# Patient Record
Sex: FEMALE | Race: WHITE | NOT HISPANIC OR LATINO | Employment: OTHER | URBAN - METROPOLITAN AREA
[De-identification: names, ages, dates, MRNs, and addresses within clinical notes are randomized per-mention and may not be internally consistent; named-entity substitution may affect disease eponyms.]

---

## 2017-02-05 ENCOUNTER — PATIENT MESSAGE (OUTPATIENT)
Dept: UROLOGY | Facility: CLINIC | Age: 47
End: 2017-02-05

## 2017-02-06 ENCOUNTER — PATIENT MESSAGE (OUTPATIENT)
Dept: UROLOGY | Facility: CLINIC | Age: 47
End: 2017-02-06

## 2017-02-09 RX ORDER — HYOSCYAMINE SULFATE 0.125 MG
125 TABLET ORAL EVERY 4 HOURS PRN
Qty: 120 TABLET | Refills: 0 | Status: ON HOLD | OUTPATIENT
Start: 2017-02-09 | End: 2017-08-16 | Stop reason: HOSPADM

## 2017-03-16 ENCOUNTER — OFFICE VISIT (OUTPATIENT)
Dept: UROLOGY | Facility: CLINIC | Age: 47
End: 2017-03-16
Payer: COMMERCIAL

## 2017-03-16 VITALS
HEART RATE: 69 BPM | SYSTOLIC BLOOD PRESSURE: 114 MMHG | WEIGHT: 211 LBS | BODY MASS INDEX: 33.12 KG/M2 | DIASTOLIC BLOOD PRESSURE: 74 MMHG | HEIGHT: 67 IN

## 2017-03-16 DIAGNOSIS — R33.9 INCOMPLETE BLADDER EMPTYING: Primary | ICD-10-CM

## 2017-03-16 DIAGNOSIS — N32.81 DETRUSOR INSTABILITY OF BLADDER: ICD-10-CM

## 2017-03-16 PROCEDURE — 99213 OFFICE O/P EST LOW 20 MIN: CPT | Mod: S$GLB,,, | Performed by: UROLOGY

## 2017-03-16 PROCEDURE — 3074F SYST BP LT 130 MM HG: CPT | Mod: S$GLB,,, | Performed by: UROLOGY

## 2017-03-16 PROCEDURE — 95972 ALYS CPLX SP/PN NPGT W/PRGRM: CPT | Mod: S$GLB,,, | Performed by: UROLOGY

## 2017-03-16 PROCEDURE — 3078F DIAST BP <80 MM HG: CPT | Mod: S$GLB,,, | Performed by: UROLOGY

## 2017-03-16 PROCEDURE — 99999 PR PBB SHADOW E&M-EST. PATIENT-LVL IV: CPT | Mod: PBBFAC,,, | Performed by: UROLOGY

## 2017-03-16 PROCEDURE — 1160F RVW MEDS BY RX/DR IN RCRD: CPT | Mod: S$GLB,,, | Performed by: UROLOGY

## 2017-03-16 RX ORDER — IBUPROFEN 200 MG
1 TABLET ORAL
COMMUNITY
Start: 2017-03-01 | End: 2017-08-10

## 2017-03-16 RX ORDER — MIRTAZAPINE 15 MG/1
15 TABLET, FILM COATED ORAL NIGHTLY
Status: ON HOLD | COMMUNITY
End: 2021-05-27 | Stop reason: HOSPADM

## 2017-03-16 RX ORDER — MELOXICAM 7.5 MG/1
15 TABLET ORAL
COMMUNITY
Start: 2017-03-14 | End: 2017-04-19

## 2017-03-16 RX ORDER — DM/P-EPHED/ACETAMINOPH/DOXYLAM 30-7.5/3
LIQUID (ML) ORAL
Refills: 0 | COMMUNITY
Start: 2017-03-01

## 2017-03-16 RX ORDER — ISOTRETINOIN 40 MG/1
CAPSULE ORAL
COMMUNITY
Start: 2017-02-21 | End: 2017-08-10

## 2017-03-16 NOTE — PROGRESS NOTES
CHIEF COMPLAINT:    Mrs. Garcia is a 46 y.o. female presenting for follow up for frequency, urgency, incomplete bladder emptying, s/p InterStim placement 12/15/15.    PRESENTING ILLNESS:    Asad Garcia is a 46 y.o. female who underwent InterStim placement on 12/15/15. She states she has done well with the device up until 2 weeks ago when there was a malfunction with her Icon. At baseline her frequency, urgency has greatly improved. She is only experiencing nocturia x 1. She feels as though she is emptying well. For the past 2 weeks since the device has not been working her symptoms are back to how they were prior to InterStim placement with frequncy, urgency, urge incontinence, and nocturia x 3. She denies dysuria or hematuria.     She was recently diagnosed with degenerative disc disease.     REVIEW OF SYSTEMS:    She denies nausea, vomiting, shortness or breath, chest pain, fever, chills.     PATIENT HISTORY:    Past Medical History:   Diagnosis Date    Asthma     Bipolar 2 disorder     Hx of bronchitis     Hx of bronchitis     Hypertension     Urinary tract infection     Vaginal infection        Past Surgical History:   Procedure Laterality Date    ANKLE SURGERY Right     TENDON REPAIR    bladder interstem stimulator      CYST REMOVAL UNDER ARM Right     HYSTERECTOMY      INGUINAL HIDRADENITIS EXCISION  01/09/2017    interstim      SPINAL CORD STIMULATOR IMPLANT         Family History   Problem Relation Age of Onset    Heart attack Father     Multiple sclerosis Mother     Hypertension Mother     Diabetes type II Mother     Depression Mother     Multiple sclerosis Brother     Mental retardation Daughter      Social History    Marital status:      Social History Main Topics    Smoking status: Current Every Day Smoker     Packs/day: 1.50     Years: 20.00     Types: Cigarettes    Smokeless tobacco: Never Used      Comment: currently in the smoking cessation program.     Alcohol use 0.0 oz/week     0 Standard drinks or equivalent per week      Comment: once per month    Drug use: Yes     Special: Marijuana      Comment: denies 1/5/2017    Sexual activity: Yes     Partners: Male       Allergies:  Cephalexin and Sulfa (sulfonamide antibiotics)    Medications:  Outpatient Encounter Prescriptions as of 3/16/2017   Medication Sig Dispense Refill    albuterol (PROAIR HFA) 90 mcg/actuation inhaler Inhale 2 puffs into the lungs every 4 (four) hours as needed.       albuterol (PROVENTIL) 2.5 mg /3 mL (0.083 %) nebulizer solution Inhale 2.5 mg into the lungs daily as needed.       aripiprazole (ABILIFY) 5 MG Tab Take 5 mg by mouth once daily.      butalbital-acetaminophen-caffeine -40 mg (FIORICET) -40 mg per tablet Take 1-2 tablets by mouth every 4 (four) hours as needed.       divalproex (DEPAKOTE) 500 MG TbEC Take 1,000 mg by mouth 2 (two) times daily.       guanfacine (TENEX) 2 MG tablet Take 2 mg by mouth 2 (two) times daily.   3    hyoscyamine (ANASPAZ,LEVSIN) 0.125 mg Tab Take 1 tablet (125 mcg total) by mouth every 4 (four) hours as needed. 120 tablet 0    isotretinoin (ACCUTANE) 40 MG capsule 1 capsule by mouth daily with food      lovastatin (MEVACOR) 10 MG tablet Take 10 mg by mouth every evening.   3    meloxicam (MOBIC) 7.5 MG tablet Take 15 mg by mouth.      minocycline (DYNACIN) 100 MG tablet Take 100 mg by mouth once daily.  1    mirtazapine (REMERON) 15 MG tablet Take 15 mg by mouth.      mirtazapine (REMERON) 7.5 MG Tab Take 15 mg by mouth nightly.       nicotine (NICODERM CQ) 21 mg/24 hr Place 1 patch onto the skin once daily. 30 patch 3    nicotine (NICODERM CQ) 21 mg/24 hr Place 1 patch onto the skin.      nicotine polacrilex 2 MG Lozg PLACE 1 LOZENGE (2 MG TOTAL) INSIDE CHEEK AS NEEDED FOR SMOKING CESSATION (MAX 20 LOZENGES PER DAY).  0    ondansetron (ZOFRAN ODT) 4 MG TbDL Take 4 mg by mouth every 6 (six) hours as needed.        polyethylene glycol (GLYCOLAX) 17 gram PwPk Take 17 g by mouth daily as needed (constipation). 24 each 0    topiramate (TOPAMAX) 50 MG tablet Take 50 mg by mouth 2 (two) times daily.       No facility-administered encounter medications on file as of 3/16/2017.          PHYSICAL EXAMINATION:    The patient generally appears in good health, is appropriately interactive, and is in no apparent distress.    Skin: No lesions.    Mental: Cooperative with normal affect.    Neuro: Grossly intact.    HEENT: Normal. No evidence of lymphadenopathy.    Chest:  normal inspiratory effort.    Abdomen: Soft, non-tender. No masses or organomegaly. Bladder is not palpable. No evidence of flank discomfort. No evidence of inguinal hernia.    Extremities: No clubbing, cyanosis, or edema      LABS:    UA today pH7, 1.005, trace leukocytes, otherwise negative      IMPRESSION:    Encounter Diagnoses   Name Primary?    Incomplete bladder emptying Yes    Detrusor instability of bladder        PLAN:    1.  Interrogation:  Last reprogrammin2016  Hours in use:  >8738  Percent use:    All lead pairs were checked at 1.5 A, 210 pulse width and ranged from 873-2876 ohms, < 15 mA    Program % Used Leads  Energy Sensation Changed  Program 1 100%  3+, 0-  1.4 A  Vaginal  Increased from 14 Hz to 21 Hz and from 210 to 270 pw  1.8 A  Program 2   3+, 1-  1.2 A  Further back in the vagina Increased from 14-21 Hz, and from 210-270 pw 1.8 A  Program 3   0+, 2-  1.3 A    Changed to 3-,2-, 0+ increased from 14-21 Hz and from 210-270 pw 1.9A  Program 4   0+, 3-  1.9 A  Vaginal  Increased 14-21 Hz, from 210-270 pw 2.5 A    Battery Life:  Programs checked:  1537 ohms, < 15 mA  % Battery life:  80-98%  Months remainin-66    Patient iCon was reprogrammed.     2.  Follow up in 1 year, sooner if needed.

## 2017-08-16 PROBLEM — L73.2 HIDRADENITIS: Status: ACTIVE | Noted: 2017-08-16

## 2018-07-06 ENCOUNTER — PATIENT MESSAGE (OUTPATIENT)
Dept: UROLOGY | Facility: CLINIC | Age: 48
End: 2018-07-06

## 2018-07-06 ENCOUNTER — TELEPHONE (OUTPATIENT)
Dept: UROLOGY | Facility: CLINIC | Age: 48
End: 2018-07-06

## 2018-07-07 NOTE — TELEPHONE ENCOUNTER
----- Message from Radha Layton LPN sent at 7/3/2018  3:10 PM CDT -----  Contact: self  [phone]      ----- Message -----  From: Sandrine Ruiz MA  Sent: 7/3/2018   2:58 PM  To: [facility] Staff    Needs Advice    Reason for call:   Can I have an MRI due to the interstim.  Communication Preference:  Phone# above or [facility]  Additional Information:  I also need the fax# to give to my doctor where they can send the order for the MRI

## 2021-05-19 PROBLEM — F29 PSYCHOSIS: Status: ACTIVE | Noted: 2021-05-19

## 2021-05-20 PROBLEM — I10 ESSENTIAL HYPERTENSION: Status: ACTIVE | Noted: 2021-05-20

## 2021-05-20 PROBLEM — S41.112S: Status: ACTIVE | Noted: 2021-05-20

## 2021-05-20 PROBLEM — M19.90 ARTHRITIS: Status: ACTIVE | Noted: 2021-05-20

## 2021-05-20 PROBLEM — E78.5 HLD (HYPERLIPIDEMIA): Status: ACTIVE | Noted: 2021-05-20

## 2023-02-26 ENCOUNTER — HOSPITAL ENCOUNTER (EMERGENCY)
Dept: HOSPITAL 92 - ERS | Age: 53
Discharge: HOME | End: 2023-02-26
Payer: COMMERCIAL

## 2023-02-26 DIAGNOSIS — F17.210: ICD-10-CM

## 2023-02-26 DIAGNOSIS — Z79.899: ICD-10-CM

## 2023-02-26 DIAGNOSIS — H65.93: ICD-10-CM

## 2023-02-26 DIAGNOSIS — J01.90: Primary | ICD-10-CM

## 2023-02-26 DIAGNOSIS — I10: ICD-10-CM

## 2023-02-26 DIAGNOSIS — Z79.82: ICD-10-CM

## 2023-02-26 DIAGNOSIS — J45.901: ICD-10-CM

## 2023-02-26 PROCEDURE — 71045 X-RAY EXAM CHEST 1 VIEW: CPT

## 2023-02-26 PROCEDURE — 94640 AIRWAY INHALATION TREATMENT: CPT

## 2023-02-26 PROCEDURE — 99406 BEHAV CHNG SMOKING 3-10 MIN: CPT
